# Patient Record
Sex: FEMALE | Race: WHITE
[De-identification: names, ages, dates, MRNs, and addresses within clinical notes are randomized per-mention and may not be internally consistent; named-entity substitution may affect disease eponyms.]

---

## 2019-11-14 ENCOUNTER — HOSPITAL ENCOUNTER (EMERGENCY)
Dept: HOSPITAL 8 - ED | Age: 24
Discharge: HOME | End: 2019-11-14
Payer: MEDICAID

## 2019-11-14 VITALS — BODY MASS INDEX: 25.44 KG/M2 | WEIGHT: 149.03 LBS | HEIGHT: 64 IN

## 2019-11-14 VITALS — SYSTOLIC BLOOD PRESSURE: 122 MMHG | DIASTOLIC BLOOD PRESSURE: 73 MMHG

## 2019-11-14 DIAGNOSIS — R10.2: ICD-10-CM

## 2019-11-14 DIAGNOSIS — N92.4: Primary | ICD-10-CM

## 2019-11-14 LAB
ALBUMIN SERPL-MCNC: 3.8 G/DL (ref 3.4–5)
ANION GAP SERPL CALC-SCNC: 6 MMOL/L (ref 5–15)
BASOPHILS # BLD AUTO: 0.07 X10^3/UL (ref 0–0.1)
BASOPHILS NFR BLD AUTO: 1 % (ref 0–1)
CALCIUM SERPL-MCNC: 9.2 MG/DL (ref 8.5–10.1)
CHLORIDE SERPL-SCNC: 110 MMOL/L (ref 98–107)
CREAT SERPL-MCNC: 0.79 MG/DL (ref 0.55–1.02)
CULTURE INDICATED?: NO
EOSINOPHIL # BLD AUTO: 0.09 X10^3/UL (ref 0–0.4)
EOSINOPHIL NFR BLD AUTO: 1 % (ref 1–7)
ERYTHROCYTE [DISTWIDTH] IN BLOOD BY AUTOMATED COUNT: 13.1 % (ref 9.6–15.2)
LYMPHOCYTES # BLD AUTO: 3.07 X10^3/UL (ref 1–3.4)
LYMPHOCYTES NFR BLD AUTO: 41 % (ref 22–44)
MCH RBC QN AUTO: 30.3 PG (ref 27–34.8)
MCHC RBC AUTO-ENTMCNC: 32.9 G/DL (ref 32.4–35.8)
MCV RBC AUTO: 92.2 FL (ref 80–100)
MD: NO
MICROSCOPIC: (no result)
MONOCYTES # BLD AUTO: 0.43 X10^3/UL (ref 0.2–0.8)
MONOCYTES NFR BLD AUTO: 6 % (ref 2–9)
NEUTROPHILS # BLD AUTO: 3.91 X10^3/UL (ref 1.8–6.8)
NEUTROPHILS NFR BLD AUTO: 52 % (ref 42–75)
PLATELET # BLD AUTO: 229 X10^3/UL (ref 130–400)
PMV BLD AUTO: 8.2 FL (ref 7.4–10.4)
RBC # BLD AUTO: 4.49 X10^6/UL (ref 3.82–5.3)

## 2019-11-14 PROCEDURE — 84703 CHORIONIC GONADOTROPIN ASSAY: CPT

## 2019-11-14 PROCEDURE — 82040 ASSAY OF SERUM ALBUMIN: CPT

## 2019-11-14 PROCEDURE — 81003 URINALYSIS AUTO W/O SCOPE: CPT

## 2019-11-14 PROCEDURE — 99284 EMERGENCY DEPT VISIT MOD MDM: CPT

## 2019-11-14 PROCEDURE — 96372 THER/PROPH/DIAG INJ SC/IM: CPT

## 2019-11-14 PROCEDURE — 80048 BASIC METABOLIC PNL TOTAL CA: CPT

## 2019-11-14 PROCEDURE — 36415 COLL VENOUS BLD VENIPUNCTURE: CPT

## 2019-11-14 PROCEDURE — 85025 COMPLETE CBC W/AUTO DIFF WBC: CPT

## 2019-11-14 PROCEDURE — 76830 TRANSVAGINAL US NON-OB: CPT

## 2019-11-14 NOTE — NUR
ASSUMED CARE OF PT AT THIS TIME FROM LOBBY. AMBULATORY TO ROOM WITH STEADY 
GAIT. PT REPORTS "VAGINAL BLEEDING THAT STARTED TODAY, HAVING CRAMPING FOR 2 
WEEKS, I WENT PEE AND THERE THE BLOOD WAS, I HAVE ENDOMETROSIS AND THOUGHT THAT 
WAS WHAT IT WAS, I'VE USED 3 PADS SINCE 2:30 TODAY, I'M ON BIRTH CONTROL, SO 
SHOULDN'T BE PREGNANT BUT WE DON'T KNOW FOR SURE." RATES PAIN 8-9/10 IN 
PELVIS/LOWER ABD, "NORMALLY 6-7/10 WITH MY ENDOMETRIOSIS." LMP 10/30/19. 
SIGNIFICANT OTHER AT BEDSIDE. CONT PULSE OX, BP MONITORS APPLIED. VSS. A&OX4. 
CALL LIGHT IN REACH. FALL PRECAUTIONS IN PLACE.

## 2019-11-14 NOTE — NUR
PT MEDICATED AS NOTED IN [facility] FOR 8-9/10 ABD PAIN AND CRAMPING. CALL LIGHT IN 
REACH. FALL PRECAUTIONS IN PLACE. VSS. SIGNIFICANT OTHER AT BEDSIDE. AWAITING 
CHART AND DISCHARGE INSTRUCTIONS FROM [facility].

## 2019-11-14 NOTE — NUR
CLEAN CATCH UA COLLECTED AND SENT TO LAB. PT RESTING IN POSITION OF COMFORT. 
SIGNIFICANT OTHER AT BEDSIDE. AWAITING EVALUATION BY [facility]. VSS. CALL LIGHT IN 
REACH